# Patient Record
Sex: FEMALE | Race: WHITE
[De-identification: names, ages, dates, MRNs, and addresses within clinical notes are randomized per-mention and may not be internally consistent; named-entity substitution may affect disease eponyms.]

---

## 2019-07-17 ENCOUNTER — HOSPITAL ENCOUNTER (EMERGENCY)
Dept: HOSPITAL 97 - ER | Age: 69
Discharge: HOME | End: 2019-07-17
Payer: COMMERCIAL

## 2019-07-17 DIAGNOSIS — Z88.5: ICD-10-CM

## 2019-07-17 DIAGNOSIS — K21.0: Primary | ICD-10-CM

## 2019-07-17 DIAGNOSIS — F17.210: ICD-10-CM

## 2019-07-17 LAB
ALBUMIN SERPL BCP-MCNC: 4 G/DL (ref 3.4–5)
ALP SERPL-CCNC: 77 U/L (ref 45–117)
ALT SERPL W P-5'-P-CCNC: 19 U/L (ref 12–78)
AST SERPL W P-5'-P-CCNC: 15 U/L (ref 15–37)
BUN BLD-MCNC: 13 MG/DL (ref 7–18)
GLUCOSE SERPLBLD-MCNC: 102 MG/DL (ref 74–106)
HCT VFR BLD CALC: 40.8 % (ref 36–45)
INR BLD: 0.97
LYMPHOCYTES # SPEC AUTO: 2.4 K/UL (ref 0.7–4.9)
MAGNESIUM SERPL-MCNC: 2.4 MG/DL (ref 1.8–2.4)
NT-PROBNP SERPL-MCNC: 31 PG/ML (ref ?–125)
PMV BLD: 10.7 FL (ref 7.6–11.3)
POTASSIUM SERPL-SCNC: 3.9 MMOL/L (ref 3.5–5.1)
RBC # BLD: 4.37 M/UL (ref 3.86–4.86)
TROPONIN (EMERG DEPT USE ONLY): < 0.02 NG/ML (ref 0–0.04)

## 2019-07-17 PROCEDURE — 83735 ASSAY OF MAGNESIUM: CPT

## 2019-07-17 PROCEDURE — 71045 X-RAY EXAM CHEST 1 VIEW: CPT

## 2019-07-17 PROCEDURE — 85610 PROTHROMBIN TIME: CPT

## 2019-07-17 PROCEDURE — 83880 ASSAY OF NATRIURETIC PEPTIDE: CPT

## 2019-07-17 PROCEDURE — 85025 COMPLETE CBC W/AUTO DIFF WBC: CPT

## 2019-07-17 PROCEDURE — 80048 BASIC METABOLIC PNL TOTAL CA: CPT

## 2019-07-17 PROCEDURE — 36415 COLL VENOUS BLD VENIPUNCTURE: CPT

## 2019-07-17 PROCEDURE — 80076 HEPATIC FUNCTION PANEL: CPT

## 2019-07-17 PROCEDURE — 93005 ELECTROCARDIOGRAM TRACING: CPT

## 2019-07-17 PROCEDURE — 84484 ASSAY OF TROPONIN QUANT: CPT

## 2019-07-17 NOTE — EDPHYS
Physician Documentation                                                                           

 [facility]                                                                 

Name: Dora Salazar                                                                              

Age: 68 yrs                                                                                       

Sex: Female                                                                                       

: 1950                                                                                   

MRN: [id number]                                                                                   

Arrival Date: 2019                                                                          

Time: 12:56                                                                                       

Account#: [id number]                                                                            

Bed 27                                                                                            

Private MD: Jareth Bal S                                                                    

ED Physician Wilian Carrasco                                                                         

HPI:                                                                                              

                                                                                             

13:52 This 68 yrs old  Female presents to ER via Ambulatory with complaints of Chest jr8 

      Pain, Chest Pressure.                                                                       

13:52 The patient or guardian reports chest pain that is located primarily in the substernal  jr8 

      area. Onset: The symptoms/episode began/occurred acutely, today. The pain does not          

      radiate. Associated signs and symptoms: Pertinent positives: None. The chest pain is        

      described as burning. Duration: The patient or guardian reports a single episode, that      

      is still ongoing, that lasted 3 hour(s). Modifying factors: The symptoms are alleviated     

      by nothing. the symptoms are aggravated by nothing. Severity of pain: At its worst the      

      pain was moderate in the emergency department the pain is unchanged. It is unknown          

      whether or not the patient has had similar symptoms in the past. The patient has not        

      recently seen a physician. Patient stated that she has history of Reflux and normally       

      takes two Zantac a day with relief. At 11 am this morning started to have bad               

      heartburn. Took her Zantac but still without relief. Stated that she started to get         

      dizzy as well .                                                                             

                                                                                                  

Historical:                                                                                       

- Allergies:                                                                                      

13:02 Codeine;                                                                                hj  

- PMHx:                                                                                           

13:02 None;                                                                                   hj  

- PSHx:                                                                                           

13:02 Hysterectomy;                                                                           hj  

                                                                                                  

- Immunization history:: Adult Immunizations.                                                     

- Social history:: Smoking status: Patient uses tobacco products, smokes one-half pack            

  cigarettes per day.                                                                             

- Ebola Screening: : No symptoms or risks identified at this time.                                

                                                                                                  

                                                                                                  

ROS:                                                                                              

13:52 Eyes: Negative for injury, pain, redness, and discharge, ENT: Negative for injury,      jr8 

      pain, and discharge, Neck: Negative for injury, pain, and swelling, Respiratory:            

      Negative for shortness of breath, cough, wheezing, and pleuritic chest pain,                

      Abdomen/GI: Negative for abdominal pain, nausea, vomiting, diarrhea, and constipation,      

      Back: Negative for injury and pain, MS/Extremity: Negative for injury and deformity,        

      Skin: Negative for injury, rash, and discoloration, Neuro: Negative for headache,           

      weakness, numbness, tingling, and seizure.                                                  

13:52 Cardiovascular: Positive for chest pain, Negative for edema, orthopnea, palpitations,       

      paroxysmal nocturnal dyspnea.                                                               

                                                                                                  

Exam:                                                                                             

13:52 Eyes:  Pupils equal round and reactive to light, extra-ocular motions intact.  Lids and jr8 

      lashes normal.  Conjunctiva and sclera are non-icteric and not injected.  Cornea within     

      normal limits.  Periorbital areas with no swelling, redness, or edema. ENT:  Nares          

      patent. No nasal discharge, no septal abnormalities noted.  Tympanic membranes are          

      normal and external auditory canals are clear.  Oropharynx with no redness, swelling,       

      or masses, exudates, or evidence of obstruction, uvula midline.  Mucous membranes           

      moist. Neck:  Trachea midline, no thyromegaly or masses palpated, and no cervical           

      lymphadenopathy.  Supple, full range of motion without nuchal rigidity, or vertebral        

      point tenderness.  No Meningismus. Chest/axilla:  Normal chest wall appearance and          

      motion.  Nontender with no deformity.  No lesions are appreciated. Cardiovascular:          

      Regular rate and rhythm with a normal S1 and S2.  No gallops, murmurs, or rubs.  Normal     

      PMI, no JVD.  No pulse deficits. Respiratory:  Lungs have equal breath sounds               

      bilaterally, clear to auscultation and percussion.  No rales, rhonchi or wheezes noted.     

       No increased work of breathing, no retractions or nasal flaring. Abdomen/GI:  Soft,        

      non-tender, with normal bowel sounds.  No distension or tympany.  No guarding or            

      rebound.  No evidence of tenderness throughout. Back:  No spinal tenderness.  No            

      costovertebral tenderness.  Full range of motion. Skin:  Warm, dry with normal turgor.      

      Normal color with no rashes, no lesions, and no evidence of cellulitis. MS/ Extremity:      

      Pulses equal, no cyanosis.  Neurovascular intact.  Full, normal range of motion. Neuro:     

       Awake and alert, GCS 15, oriented to person, place, time, and situation.  Cranial          

      nerves II-XII grossly intact.  Motor strength 5/5 in all extremities.  Sensory grossly      

      intact.  Cerebellar exam normal.  Normal gait.                                              

                                                                                                  

Vital Signs:                                                                                      

13:02  / 78; Pulse 71; Resp 18; Temp 98.6(O); Pulse Ox 100% on R/A; Weight 72.57 kg;    hj  

      Height 5 ft. 2 in. (157.48 cm); Pain 8/10;                                                  

14:35  / 70; Pulse 72; Resp 18 S; Pulse Ox 97% on R/A;                                  aa5 

15:26  / 76; Pulse 65; Resp 14 S; Pulse Ox 98% on R/A;                                  ca1 

16:27  / 70; Pulse 68; Resp 14 S; Temp 98.4(O); Pulse Ox 96% on R/A;                    ca1 

17:17  / 62; Pulse 72; Resp 19 S; Temp 98.2(TE); Pulse Ox 98% on R/A;                   ca1 

13:02 Body Mass Index 29.26 (72.57 kg, 157.48 cm)                                             hj  

                                                                                                  

MDM:                                                                                              

13:05 Patient medically screened.                                                             jr8 

15:39 Data reviewed: vital signs, nurses notes, lab test result(s), EKG, radiologic studies,  jr8 

      plain films. Data interpreted: Pulse oximetry: on room air is 97 %. Interpretation:         

      normal. Counseling: I had a detailed discussion with the patient and/or guardian            

      regarding: the historical points, exam findings, and any diagnostic results supporting      

      the discharge/admit diagnosis, lab results, radiology results. Response to treatment:       

      the patient's symptoms have markedly improved after treatment.                              

                                                                                                  

                                                                                             

13:31 Order name: Basic Metabolic Panel                                                       [facility] 

                                                                                             

13:31 Order name: CBC with Diff                                                               [facility] 

                                                                                             

13:31 Order name: LFT's; Complete Time: 14:42                                                 [facility] 

                                                                                             

13:31 Order name: Magnesium; Complete Time: 14:42                                             [facility] 

                                                                                             

13:31 Order name: NT PRO-BNP; Complete Time: 14:42                                            [facility] 

                                                                                             

13:31 Order name: PT-INR; Complete Time: 14:07                                                [facility] 

                                                                                             

13:31 Order name: Troponin (emerg Dept Use Only); Complete Time: 14:42                        [facility] 

                                                                                             

13:31 Order name: XRAY Chest (1 view); Complete Time: 13:51                                   [facility] 

                                                                                             

13:32 Order name: Basic Metabolic Panel; Complete Time: 14:42                                 [facility]

                                                                                             

13:32 Order name: CBC with Automated Diff; Complete Time: 14:07                               [facility]

                                                                                             

15:40 Order name: Troponin (emerg Dept Use Only); Complete Time: 17:01                                                                                                                     

13:31 Order name: EKG; Complete Time: 13:32                                                                                                                                                

13:31 Order name: Cardiac monitoring; Complete Time: :                                                                                             

13:31 Order name: EKG - Nurse/Tech; Complete Time: :33                                                                                                                                   

13:31 Order name: IV Saline Lock; Complete Time: 13:                                                                                             

13:31 Order name: Labs collected and sent; Complete Time: :                                                                                             

13:31 Order name: O2 Per Protocol; Complete Time: :                                                                                             

13:31 Order name: O2 Sat Monitoring; Complete Time: : 

                                                                                                  

Administered Medications:                                                                         

14:35 Drug: GI Cocktail without Donnatal - (Maalox Suspension 30 ml, Lidocaine Liquid 2 % 15  aa5 

      ml) Route: PO;                                                                              

15:30 Follow up: Response: No adverse reaction; Pain is decreased                             ca1 

14:36 Drug: Aspirin Chewable Tablet 324 mg Route: PO;                                         aa5 

15:30 Follow up: Response: No adverse reaction; Pain is decreased                             ca1 

16:17 Drug: ProTONIX 40 mg Route: IVP; Site: right antecubital;                               ca1 

17:17 Follow up: Response: No adverse reaction; Pain is decreased                             ca1 

                                                                                                  

                                                                                                  

Disposition:                                                                                      

18:02 Co-signature as Attending Physician, Wilian Carrasco MD.                                    rn  

                                                                                                  

Disposition:                                                                                      

19 17:02 Discharged to Home. Impression: Gastro-esophageal reflux disease with              

  esophagitis, Other chest pain.                                                                  

- Condition is Stable.                                                                            

- Discharge Instructions: Esophagitis.                                                            

- Prescriptions for Protonix 40 mg Oral Tablet - take 1 tablet by ORAL route once                 

  daily; 30 tablet.                                                                               

- Medication Reconciliation Form, Thank You Letter, Antibiotic Education, Prescription            

  Opioid Use form.                                                                                

- Follow up: Eulogio Kellogg MD; When: 5 - 6 days; Reason: Recheck today's complaints,           

  Continuance of care, Re-evaluation by your physician.                                           

- Problem is new.                                                                                 

- Symptoms have improved.                                                                         

                                                                                                  

                                                                                                  

                                                                                                  

Signatures:                                                                                       

Dispatcher [employer]                           EDWilian Mccoy MD MD rn Calderon, Audri, RN                     RN   aa5                                                  

Heri Lynch PA                        PA   jr8                                                  

Bang García RN RN   hj                                                   

AcobBridgette RN                        RN   ca1                                                  

                                                                                                  

Corrections: (The following items were deleted from the chart)                                    

17:02 17:02 2019 17:02 Discharged to Home. Impression: Gastro-esophageal reflux disease jr8 

      with esophagitis. Condition is Stable. Forms are Medication Reconciliation Form, Thank      

      You Letter, Antibiotic Education, Prescription Opioid Use. Follow up: Eulogio Kellogg;       

      When: 5 - 6 days; Reason: Recheck today's complaints, Continuance of care,                  

      Re-evaluation by your physician. Problem is new. Symptoms have improved. jr8                

17:19 17:02 2019 17:02 Discharged to Home. Impression: Gastro-esophageal reflux disease ca1 

      with esophagitis; Other chest pain. Condition is Stable. Forms are Medication               

      Reconciliation Form, Thank You Letter, Antibiotic Education, Prescription Opioid Use.       

      Follow up: Eulogio Kellogg; When: 5 - 6 days; Reason: Recheck today's complaints,            

      Continuance of care, Re-evaluation by your physician. Problem is new. Symptoms have         

      improved. jr8                                                                               

                                                                                                  

**************************************************************************************************

## 2019-07-17 NOTE — EKG
Test Date:    2019-07-17               Test Time:    13:03:13

Technician:   RAFFAELE                                     

                                                     

MEASUREMENT RESULTS:                                       

Intervals:                                           

Rate:         71                                     

ID:           [id number]                                    

QRSD:         64                                     

QT:           386                                    

QTc:          419                                    

Axis:                                                

P:            61                                     

ID:           140                                    

QRS:          63                                     

T:            65                                     

                                                     

INTERPRETIVE STATEMENTS:                                       

                                                     

Normal sinus rhythm

Low voltage QRS

Borderline ECG

Compared to ECG 05/13/2005 05:50:00

Low QRS voltage now present



Electronically Signed On 07-17-19 15:04:12 CDT by Guzman Miller

## 2019-07-17 NOTE — ER
Nurse's Notes                                                                                     

 [facility]                                                                 

Name: Dora Salazar                                                                              

Age: 68 yrs                                                                                       

Sex: Female                                                                                       

: 1950                                                                                   

MRN: [id number]                                                                                   

Arrival Date: 2019                                                                          

Time: 12:56                                                                                       

Account#: [id number]                                                                            

Bed 27                                                                                            

Private MD: Jareth Bal S                                                                    

Diagnosis: Gastro-esophageal reflux disease with esophagitis;Other chest pain                     

                                                                                                  

Presentation:                                                                                     

                                                                                             

12:59 Presenting complaint: Patient states: i have this chest pain, like a dull burning pain  hj  

      since 9:30 am; i work last night 12 hour shift, i have a bad heart burn; pain is 7/10;      

      reports N/V; reports SOB;. Transition of care: patient was not received from another        

      setting of care. Onset of symptoms was 2019. Risk Assessment: Do you want to       

      hurt yourself or someone else? Patient reports no desire to harm self or others.            

      Initial Sepsis Screen: Does the patient meet any 2 criteria? No. Patient's initial          

      sepsis screen is negative. Does the patient have a suspected source of infection? No.       

      Patient's initial sepsis screen is negative. Care prior to arrival: None.                   

12:59 Method Of Arrival: Ambulatory                                                             

12:59 Acuity: JAMISON 3                                                                           hj  

                                                                                                  

Historical:                                                                                       

- Allergies:                                                                                      

13:02 Codeine;                                                                                hj  

- PMHx:                                                                                           

13:02 None;                                                                                   hj  

- PSHx:                                                                                           

13:02 Hysterectomy;                                                                           hj  

                                                                                                  

- Immunization history:: Adult Immunizations.                                                     

- Social history:: Smoking status: Patient uses tobacco products, smokes one-half pack            

  cigarettes per day.                                                                             

- Ebola Screening: : No symptoms or risks identified at this time.                                

                                                                                                  

                                                                                                  

Screenin:10 Abuse screen: Denies threats or abuse. Denies injuries from another. Nutritional        ca1 

      screening: No deficits noted. Tuberculosis screening: No symptoms or risk factors           

      identified. Fall Risk IV access (20 points).                                                

                                                                                                  

Assessment:                                                                                       

13:10 General: Appears in no apparent distress. uncomfortable, Behavior is calm, cooperative, ca1 

      appropriate for age, Reports unable to lie down. C/O SOB and heartburn when laying          

      down. Pain: Complains of pain in mid-sternal area Pain does not radiate. Pain currently     

      is 8 out of 10 on a pain scale. Quality of pain is described as pressure, Pain began 2      

      hours ago. Is intermittent, Aggravated by repositioning. Neuro: Level of Consciousness      

      is awake, alert, obeys commands, Oriented to person, place, time, situation.                

      Cardiovascular: Heart tones S1 S2 present Capillary refill < 3 seconds Patient's skin       

      is warm and dry. Pulses are all present. Rhythm is sinus rhythm. Respiratory: Airway is     

      patent Respiratory effort is even, unlabored, Respiratory pattern is regular,               

      symmetrical, Breath sounds are clear bilaterally. Respiratory: Reports shortness of         

      breath. GI: Abdomen is flat, non-distended, Bowel sounds present X 4 quads. Abd is soft     

      and non tender X 4 quads. Reports nausea, vomiting. : No deficits noted. No signs         

      and/or symptoms were reported regarding the genitourinary system. EENT: No deficits         

      noted. No signs and/or symptoms were reported regarding the EENT system. Derm: Skin is      

      intact, is healthy with good turgor, Skin is pink, warm \T\ dry. Musculoskeletal:           

      Circulation, motion, and sensation intact. Capillary refill < 3 seconds, Range of           

      motion: intact in all extremities.                                                          

14:35 Reassessment: Patient is alert, oriented x 3, equal unlabored respirations, skin        aa5 

      warm/dry/pink.                                                                              

15:26 Reassessment: Patient appears in no apparent distress at this time. Patient and/or      ca1 

      family updated on plan of care and expected duration. Pain level reassessed. Patient is     

      alert, oriented x 3, equal unlabored respirations, skin warm/dry/pink.                      

16:27 Reassessment: Patient appears in no apparent distress at this time. Patient and/or      ca1 

      family updated on plan of care and expected duration. Pain level reassessed. Patient is     

      alert, oriented x 3, equal unlabored respirations, skin warm/dry/pink.                      

17:17 Reassessment: Patient appears in no apparent distress at this time. Patient is alert,   ca1 

      oriented x 3, equal unlabored respirations, skin warm/dry/pink. Patient states feeling      

      better.                                                                                     

                                                                                                  

Vital Signs:                                                                                      

13:02  / 78; Pulse 71; Resp 18; Temp 98.6(O); Pulse Ox 100% on R/A; Weight 72.57 kg;    hj  

      Height 5 ft. 2 in. (157.48 cm); Pain 8/10;                                                  

14:35  / 70; Pulse 72; Resp 18 S; Pulse Ox 97% on R/A;                                  aa5 

15:26  / 76; Pulse 65; Resp 14 S; Pulse Ox 98% on R/A;                                  ca1 

16:27  / 70; Pulse 68; Resp 14 S; Temp 98.4(O); Pulse Ox 96% on R/A;                    ca1 

17:17  / 62; Pulse 72; Resp 19 S; Temp 98.2(TE); Pulse Ox 98% on R/A;                   ca1 

13:02 Body Mass Index 29.26 (72.57 kg, 157.48 cm)                                               

                                                                                                  

ED Course:                                                                                        

12:56 Patient arrived in ED.                                                                  as  

12:57 Jareth aBl MD is Private Physician.                                               as  

13:01 Triage completed.                                                                       hj  

13:02 Arm band placed on left wrist.                                                          hj  

13:04 Heri Lynch PA is PHCP.                                                               [id number] 

13:04 Wilian Carrasco MD is Attending Physician.                                                [email or web address] 

13:10 Patient has correct armband on for positive identification. Placed in gown. Bed in low  ca1 

      position. Call light in reach. Side rails up X 1. Cardiac monitor on. Pulse ox on. NIBP     

      on. Warm blanket given.                                                                     

13:10 No provider procedures requiring assistance completed.                                  ca1 

13:21 Bridgette Morgan, RN is Primary Nurse.                                                      ca1 

13:35 Inserted saline lock: 22 gauge in right antecubital area, using aseptic technique.      ca1 

      Blood collected. Patient maintains SpO2 saturation greater than 95% on room air.            

13:45 XRAY Chest (1 view) In Process Unspecified.                                             EDMS

17:01 Eulogio Kellogg MD is Referral Physician.                                              [email or web address] 

17:18 IV discontinued, intact, bleeding controlled, No redness/swelling at site. Pressure     ca1 

      dressing applied.                                                                           

                                                                                                  

Administered Medications:                                                                         

14:35 Drug: GI Cocktail without Donnatal - (Maalox Suspension 30 ml, Lidocaine Liquid 2 % 15  aa5 

      ml) Route: PO;                                                                              

15:30 Follow up: Response: No adverse reaction; Pain is decreased                             ca1 

14:36 Drug: Aspirin Chewable Tablet 324 mg Route: PO;                                         aa5 

15:30 Follow up: Response: No adverse reaction; Pain is decreased                             ca1 

16:17 Drug: ProTONIX 40 mg Route: IVP; Site: right antecubital;                               ca1 

17:17 Follow up: Response: No adverse reaction; Pain is decreased                             ca1 

                                                                                                  

                                                                                                  

Outcome:                                                                                          

17:02 Discharge ordered by MD.                                                                [id number] 

17:18 Discharged to home ambulatory, with family.                                             ca1 

17:18 Condition: stable                                                                           

17:18 Discharge instructions given to patient, Instructed on discharge instructions, follow       

      up and referral plans. medication usage, Demonstrated understanding of instructions,        

      follow-up care, medications, Prescriptions given X 1.                                       

17:19 Patient left the ED.                                                                    ca1 

                                                                                                  

Signatures:                                                                                       

Dispatcher [employer]                           Danisha Daly Audri, RN                     RN   aa5                                                  

Heri Lynch PA PA [email or web address]                                                  

Bang García RN RN                                                      

Bridgette Morgan RN                        RN   ca1                                                  

                                                                                                  

Corrections: (The following items were deleted from the chart)                                    

13:04 13:02 Pulse 71bpm; Resp 18bpm; Pulse Ox 100% RA; Temp 98.6F Oral; 72.57 kg; Height 5    hj  

      ft. 2 in.; BMI: 29.2; Pain 8/10; hj                                                         

                                                                                                  

**************************************************************************************************

## 2019-07-17 NOTE — RAD REPORT
EXAM DESCRIPTION:  Raissa Single View7/17/2019 1:43 pm

 

CLINICAL HISTORY:  Chest pain

 

COMPARISON:  none

 

FINDINGS:   The lungs appear clear of acute infiltrate. The heart is normal size

 

IMPRESSION:   No acute abnormalities displayed

## 2019-09-30 ENCOUNTER — HOSPITAL ENCOUNTER (EMERGENCY)
Dept: HOSPITAL 97 - ER | Age: 69
Discharge: HOME | End: 2019-09-30
Payer: COMMERCIAL

## 2019-09-30 VITALS — DIASTOLIC BLOOD PRESSURE: 70 MMHG | SYSTOLIC BLOOD PRESSURE: 144 MMHG | OXYGEN SATURATION: 97 %

## 2019-09-30 VITALS — TEMPERATURE: 97.3 F

## 2019-09-30 DIAGNOSIS — R07.9: Primary | ICD-10-CM

## 2019-09-30 DIAGNOSIS — K21.9: ICD-10-CM

## 2019-09-30 DIAGNOSIS — Z88.6: ICD-10-CM

## 2019-09-30 LAB
ALBUMIN SERPL BCP-MCNC: 4.2 G/DL (ref 3.4–5)
ALP SERPL-CCNC: 68 U/L (ref 45–117)
ALT SERPL W P-5'-P-CCNC: 18 U/L (ref 12–78)
AST SERPL W P-5'-P-CCNC: 16 U/L (ref 15–37)
BUN BLD-MCNC: 13 MG/DL (ref 7–18)
GLUCOSE SERPLBLD-MCNC: 102 MG/DL (ref 74–106)
HCT VFR BLD CALC: 39.6 % (ref 36–45)
LIPASE SERPL-CCNC: 82 U/L (ref 73–393)
LYMPHOCYTES # SPEC AUTO: 1.6 K/UL (ref 0.7–4.9)
NT-PROBNP SERPL-MCNC: 59 PG/ML (ref ?–125)
PMV BLD: 10.5 FL (ref 7.6–11.3)
POTASSIUM SERPL-SCNC: 3.9 MMOL/L (ref 3.5–5.1)
RBC # BLD: 4.26 M/UL (ref 3.86–4.86)
TROPONIN (EMERG DEPT USE ONLY): < 0.02 NG/ML (ref 0–0.04)

## 2019-09-30 PROCEDURE — 36415 COLL VENOUS BLD VENIPUNCTURE: CPT

## 2019-09-30 PROCEDURE — 71045 X-RAY EXAM CHEST 1 VIEW: CPT

## 2019-09-30 PROCEDURE — 80076 HEPATIC FUNCTION PANEL: CPT

## 2019-09-30 PROCEDURE — 85025 COMPLETE CBC W/AUTO DIFF WBC: CPT

## 2019-09-30 PROCEDURE — 80048 BASIC METABOLIC PNL TOTAL CA: CPT

## 2019-09-30 PROCEDURE — 93005 ELECTROCARDIOGRAM TRACING: CPT

## 2019-09-30 PROCEDURE — 84484 ASSAY OF TROPONIN QUANT: CPT

## 2019-09-30 PROCEDURE — 83690 ASSAY OF LIPASE: CPT

## 2019-09-30 PROCEDURE — 83880 ASSAY OF NATRIURETIC PEPTIDE: CPT

## 2019-09-30 PROCEDURE — 99284 EMERGENCY DEPT VISIT MOD MDM: CPT

## 2019-09-30 PROCEDURE — 96374 THER/PROPH/DIAG INJ IV PUSH: CPT

## 2019-09-30 NOTE — ER
Nurse's Notes                                                                                     

 [facility]                                                                 

Name: Dora Salazar                                                                              

Age: 69 yrs                                                                                       

Sex: Female                                                                                       

: 1950                                                                                   

MRN: [id number]                                                                                   

Arrival Date: 2019                                                                          

Time: 12:28                                                                                       

Account#: [id number]                                                                            

Bed 4                                                                                             

Private MD: Jareth Bal S                                                                    

Diagnosis: Chest pain, unspecified;Gastro-esophageal reflux disease with esophagitis              

                                                                                                  

Presentation:                                                                                     

                                                                                             

12:36 Presenting complaint: Patient states: Since 1000 this morning I have been having a      la1 

      burning pain in my chest from my throat down but also feeling kind of lightheaded. I am     

      on a daily PPI from my GI but it isnt helping. Transition of care: patient was not          

      received from another setting of care. Onset of symptoms was 2019. Risk       

      Assessment: Do you want to hurt yourself or someone else? Patient reports no desire to      

      harm self or others. Initial Sepsis Screen: Does the patient meet any 2 criteria? No.       

      Patient's initial sepsis screen is negative. Does the patient have a suspected source       

      of infection? No. Patient's initial sepsis screen is negative. Care prior to arrival:       

      None.                                                                                       

12:36 Method Of Arrival: Ambulatory                                                           la1 

12:36 Acuity: JAMISON 3                                                                           la1 

                                                                                                  

Historical:                                                                                       

- Allergies:                                                                                      

14:53 Codeine;                                                                                tw2 

- PSHx:                                                                                           

14:53 Hysterectomy;                                                                           tw2 

                                                                                                  

- Immunization history:: Adult Immunizations up to date.                                          

- Social history:: Smoking status: .                                                              

- Ebola Screening: : Patient denies travel to an Ebola-affected area in the 21 days               

  before illness onset.                                                                           

- Family history:: not pertinent.                                                                 

- Hospitalizations: : No recent hospitalization is reported.                                      

                                                                                                  

                                                                                                  

Screenin:15 Abuse screen: Denies threats or abuse. Denies injuries from another. Nutritional        aj1 

      screening: No deficits noted. Tuberculosis screening: No symptoms or risk factors           

      identified. Fall Risk None identified.                                                      

                                                                                                  

Assessment:                                                                                       

14:15 General: Appears in no apparent distress. comfortable, Behavior is calm, cooperative,   aj1 

      appropriate for age. Pain: Complains of pain in mid-sternal area Pain does not radiate.     

      Pain currently is 5 out of 10 on a pain scale. Quality of pain is described as burning,     

      Pain began gradually. Neuro: Level of Consciousness is awake, alert, obeys commands,        

      Oriented to person, place, time, situation. Cardiovascular: Reports chest pain, Denies      

      shortness of breath, Heart tones S1 S2 present Patient's skin is warm and dry. Rhythm       

      is regular. Respiratory: Airway is patent Respiratory effort is even, unlabored,            

      Respiratory pattern is regular, symmetrical, Breath sounds are clear bilaterally. GI:       

      Abdomen is non-distended, Reports that she recently had an endoscopy done by Dr. Kellogg     

      and she was told that she had esophagitis, a peptic ulcer and a hernia. States that she     

      was given a GI cocktail in triage and that help diminish her pain by a great deal. :      

      No signs and/or symptoms were reported regarding the genitourinary system. EENT: No         

      signs and/or symptoms were reported regarding the EENT system. Derm: No signs and/or        

      symptoms reported regarding the dermatologic system. Skin is pink, warm \T\ dry. normal.    

      Musculoskeletal: No signs and/or symptoms reported regarding the musculoskeletal            

      system. Circulation, motion, and sensation intact.                                          

15:48 Reassessment: Patient appears in no apparent distress at this time. No changes from     tw2 

      previously documented assessment. Patient and/or family updated on plan of care and         

      expected duration. Pain level reassessed. Patient is alert, oriented x 3, equal             

      unlabored respirations, skin warm/dry/pink.                                                 

16:02 Reassessment: Patient appears in no apparent distress at this time. No changes from     tw2 

      previously documented assessment. Patient and/or family updated on plan of care and         

      expected duration. Pain level reassessed. Patient is alert, oriented x 3, equal             

      unlabored respirations, skin warm/dry/pink.                                                 

                                                                                                  

Vital Signs:                                                                                      

12:37  / 73; Pulse 74; Resp 16; Temp 97.3; Pulse Ox 100% on R/A;                        la1 

14:51  / 66; Pulse 62; Resp 10; Pulse Ox 99% on R/A;                                    tw2 

15:48  / 70; Pulse 64; Resp 20; Pulse Ox 97% on R/A;                                    tw2 

                                                                                                  

ED Course:                                                                                        

12:28 Patient arrived in ED.                                                                  as  

12:28 Jareth Bal MD is Private Physician.                                               as  

12:37 Triage completed.                                                                       la1 

12:37 Arm band placed on right wrist. EKG completed in triage. Results shown to MD.           la1 

14:00 Amber De León, RN is Primary Nurse.                                                   aj1 

14:01 Wilian Carrasco MD is Attending Physician.                                                rn  

14:15 Patient has correct armband on for positive identification. Cardiac monitor on. Pulse   aj1 

      ox on. NIBP on.                                                                             

14:18 No provider procedures requiring assistance completed. Patient maintains SpO2           aj1 

      saturation greater than 95% on room air.                                                    

16:02 IV discontinued, intact, bleeding controlled, No redness/swelling at site. Pressure     tw2 

      dressing applied.                                                                           

                                                                                                  

Administered Medications:                                                                         

14:22 Drug: Pepcid 20 mg Route: IVP; Site: right antecubital;                                 tw2 

15:54 Follow up: Response: No adverse reaction; Pain is decreased                             tw2 

                                                                                                  

                                                                                                  

Outcome:                                                                                          

15:49 Discharge ordered by MD.                                                                rn  

16:02 Discharged to home ambulatory.                                                          tw2 

16:02 Condition: stable                                                                           

16:02 Discharge instructions given to patient, Instructed on discharge instructions, follow       

      up and referral plans. Demonstrated understanding of instructions, follow-up care.          

16:03 Patient left the ED.                                                                    tw2 

                                                                                                  

Signatures:                                                                                       

Amber De León RN RN   aj1                                                  

Danisha Gandara Roman, MD MD rn Attema, Lee, RN RN   la1                                                  

Danita Mcclendon RN RN   tw2                                                  

                                                                                                  

**************************************************************************************************

## 2019-09-30 NOTE — EDPHYS
Physician Documentation                                                                           

 [facility]                                                                 

Name: Dora Salzaar                                                                              

Age: 69 yrs                                                                                       

Sex: Female                                                                                       

: 1950                                                                                   

MRN: [id number]                                                                                   

Arrival Date: 2019                                                                          

Time: 12:28                                                                                       

Account#: [id number]                                                                            

Bed 4                                                                                             

Private MD: Jareth Bal S                                                                    

ED Physician Wilian Carrasco                                                                         

HPI:                                                                                              

                                                                                             

15:14 This 69 yrs old  Female presents to ER via Ambulatory with complaints of Chest rn  

      Pain, Dizziness.                                                                            

15:14 The patient or guardian reports chest pain that is located primarily in the substernal  rn  

      area. Onset: 2 week(s) ago. The pain radiates to abdomen. Associated signs and              

      symptoms: Pertinent positives: abdominal pain, Pertinent negatives: cough, diaphoresis,     

      dizziness, lightheadedness, near syncope, palpitations, shortness of breath, syncope,       

      vomiting. The chest pain is described as burning. Duration: The patient or guardian         

      reports multiple episodes, that are intermittent. Modifying factors: The symptoms are       

      alleviated by nothing. the symptoms are aggravated by palpation of area, food. Severity     

      of pain: At its worst the pain was mild in the emergency department the pain has            

      improved. The patient has experienced similar episodes in the past. REports had EGD 2       

      weeks ago, told has esophagitis and gastric ulcer, put on antacids, has still been          

      having problems since then, taking meds, reports seen for this before and resolved with     

      GI cocktail. Reports burning pain from stomach to chest. No radiation. .                    

                                                                                                  

Historical:                                                                                       

- Allergies:                                                                                      

14:53 Codeine;                                                                                tw2 

- PSHx:                                                                                           

14:53 Hysterectomy;                                                                           tw2 

                                                                                                  

- Immunization history:: Adult Immunizations up to date.                                          

- Social history:: Smoking status: .                                                              

- Ebola Screening: : Patient denies travel to an Ebola-affected area in the 21 days               

  before illness onset.                                                                           

- Family history:: not pertinent.                                                                 

- Hospitalizations: : No recent hospitalization is reported.                                      

                                                                                                  

                                                                                                  

ROS:                                                                                              

15:14 Constitutional: Negative for fever, chills, and weight loss, Eyes: Negative for injury, rn  

      pain, redness, and discharge, Neck: Negative for injury, pain, and swelling,                

      Cardiovascular: Negative for palpitations, and edema, Respiratory: Negative for             

      shortness of breath, cough, wheezing, and pleuritic chest pain, Abdomen/GI: Negative        

      for vomiting, diarrhea, and constipation, Back: Negative for injury and pain,               

      MS/Extremity: Negative for injury and deformity, Skin: Negative for injury, rash, and       

      discoloration, Neuro: Negative for headache, weakness, numbness, tingling, and seizure.     

                                                                                                  

Exam:                                                                                             

15:14 Constitutional:  This is a well developed, well nourished patient who is awake, alert,  rn  

      and in no acute distress. Head/Face:  Normocephalic, atraumatic. Eyes:  Pupils equal        

      round and reactive to light, extra-ocular motions intact.  Lids and lashes normal.          

      Conjunctiva and sclera are non-icteric and not injected.  Cornea within normal limits.      

      Periorbital areas with no swelling, redness, or edema. Cardiovascular:  Regular rate        

      and rhythm.  No pulse deficits. Respiratory:  Lungs have equal breath sounds                

      bilaterally, clear to auscultation.  No increased work of breathing, no retractions or      

      nasal flaring. Abdomen/GI:  soft, non-tender MS/ Extremity:  Pulses equal, no cyanosis.     

       Neurovascular intact.  Full, normal range of motion.  Equal circumference. Neuro:          

      Awake and alert, GCS 15, oriented to person, place, time, and situation.  Cranial           

      nerves II-XII grossly intact.  Motor strength 5/5 in all extremities.  Sensory grossly      

      intact.  Cerebellar exam normal.  Normal gait.                                              

                                                                                                  

Vital Signs:                                                                                      

12:37  / 73; Pulse 74; Resp 16; Temp 97.3; Pulse Ox 100% on R/A;                        la1 

14:51  / 66; Pulse 62; Resp 10; Pulse Ox 99% on R/A;                                    tw2 

15:48  / 70; Pulse 64; Resp 20; Pulse Ox 97% on R/A;                                    tw2 

                                                                                                  

MDM:                                                                                              

14:01 Patient medically screened.                                                             rn  

15:14 ED course: Patient given GI cocktail in lobby, when brought back reports resolved pain, rn  

      feels much better. .                                                                        

15:48 Differential diagnosis: acute pericarditis, coronary artery disease costochondritis,    rn  

      esophagitis, gastritis, gastroesophageal reflux disease (GERD), pancreatitis, peptic        

      ulcer disease, pericarditis, pleurisy. Data reviewed: vital signs, nurses notes, lab        

      test result(s), EKG, radiologic studies, plain films, and as a result, I will discharge     

      patient. Counseling: I had a detailed discussion with the patient and/or guardian           

      regarding: the historical points, exam findings, and any diagnostic results supporting      

      the discharge/admit diagnosis, lab results, radiology results, the need for outpatient      

      follow up, to return to the emergency department if symptoms worsen or persist or if        

      there are any questions or concerns that arise at home. Special discussion: I discussed     

      with the patient/guardian in detail that at this point there is no indication for           

      admission to the hospital. It is understood, however, that if the symptoms persist or       

      worsen the patient needs to return immediately for re-evaluation.                           

                                                                                                  

                                                                                             

14:06 Order name: Basic Metabolic Panel                                                       rn  

                                                                                             

14:06 Order name: CBC with Diff                                                               rn  

                                                                                             

14:06 Order name: LFT's                                                                       rn  

                                                                                             

14:06 Order name: NT PRO-BNP                                                                  rn  

                                                                                             

14:06 Order name: Troponin (emerg Dept Use Only)                                              rn  

                                                                                             

14:06 Order name: Lipase                                                                      rn  

                                                                                             

14:06 Order name: XRAY Chest (1 view)                                                         rn  

                                                                                             

15:30 Order name: CBC with Automated Diff; Complete Time: 15:47                               EDMS

                                                                                             

15:30 Order name: Basic Metabolic Panel; Complete Time: 15:47                                 EDMS

                                                                                             

15:30 Order name: Liver (Hepatic) Function; Complete Time: 15:47                              EDMS

                                                                                             

15:30 Order name: Troponin (Emerg Dept Use Only); Complete Time: 15:47                        EDMS

                                                                                             

15:30 Order name: NT PRO-BNP; Complete Time: 15:47                                            EDMS

                                                                                             

15:30 Order name: Lipase; Complete Time: 15:47                                                EDMS

                                                                                             

15:35 Order name: RAD; Complete Time: 15:47                                                   EDMS

                                                                                             

14:06 Order name: EKG; Complete Time: 15:32                                                   rn  

                                                                                             

14:06 Order name: Cardiac monitoring; Complete Time: 14:13                                    rn  

                                                                                             

14:06 Order name: EKG - Nurse/Tech; Complete Time: 14:13                                      rn  

                                                                                             

14:06 Order name: IV Saline Lock; Complete Time: 14:25                                        rn  

                                                                                             

14:06 Order name: Labs collected and sent; Complete Time: 14:25                               rn  

                                                                                             

14:06 Order name: O2 Per Protocol; Complete Time: 14:13                                       rn  

                                                                                             

14:06 Order name: O2 Sat Monitoring; Complete Time: 14:13                                     rn  

                                                                                                  

Administered Medications:                                                                         

14:22 Drug: Pepcid 20 mg Route: IVP; Site: right antecubital;                                 tw2 

15:54 Follow up: Response: No adverse reaction; Pain is decreased                             tw2 

                                                                                                  

                                                                                                  

Disposition:                                                                                      

19 15:49 Discharged to Home. Impression: Chest pain, unspecified, Gastro-esophageal         

  reflux disease with esophagitis.                                                                

- Condition is Stable.                                                                            

- Discharge Instructions: Food Choices for Gastroesophageal Reflux Disease, Adult,                

  Esophagitis, Gastroesophageal Reflux Disease, Adult.                                            

                                                                                                  

- Medication Reconciliation Form, Thank You Letter, Antibiotic Education, Prescription            

  Opioid Use form.                                                                                

- Follow up: Private Physician; When: As needed; Reason: Recheck today's complaints,              

  Re-evaluation by your physician.                                                                

- Problem is new.                                                                                 

- Symptoms have improved.                                                                         

                                                                                                  

                                                                                                  

                                                                                                  

Signatures:                                                                                       

Dispatcher [employer]                           EDAmber Bonds RN                     RN   [id number]                                                  

Wilian Carrasco MD MD rn Wise, Tara, RN                          RN   tw2                                                  

                                                                                                  

Corrections: (The following items were deleted from the chart)                                    

16:03 15:49 2019 15:49 Discharged to Home. Impression: Chest pain, unspecified;         tw2 

      Gastro-esophageal reflux disease with esophagitis. Condition is Stable. Forms are           

      Medication Reconciliation Form, Thank You Letter, Antibiotic Education, Prescription        

      Opioid Use. Follow up: Private Physician; When: As needed; Reason: Recheck today's          

      complaints, Re-evaluation by your physician. Problem is new. Symptoms have improved. rn     

                                                                                                  

**************************************************************************************************

## 2019-09-30 NOTE — RAD REPORT
EXAM DESCRIPTION:  RAD - Chest Single View - 9/30/2019 2:50 pm

 

CLINICAL HISTORY:  Chest pain

 

COMPARISON:  July 17

 

TECHNIQUE:  AP portable chest image was obtained 1445 hours .

 

FINDINGS:  Lungs are clear. Heart and vasculature are normal. No measurable pleural effusion and no p
neumothorax. No acute bony abnormality seen. No acute aortic findings suspected.

 

IMPRESSION:  No acute cardiopulmonary process.

 

 No significant change from comparison.

## 2019-10-01 NOTE — EKG
Test Date:    2019-09-30               Test Time:    12:40:08

Technician:   RAFFAELE                                     

                                                     

MEASUREMENT RESULTS:                                       

Intervals:                                           

Rate:         74                                     

OR:           134                                    

QRSD:         64                                     

QT:           388                                    

QTc:          430                                    

Axis:                                                

P:            45                                     

OR:           134                                    

QRS:          67                                     

T:            70                                     

                                                     

INTERPRETIVE STATEMENTS:                                       

                                                     

Normal sinus rhythm

Septal infarct, age undetermined

Abnormal ECG

Compared to ECG 07/17/2019 13:03:13

Myocardial infarct finding now present



Electronically Signed On 10-01-19 07:27:43 CDT by Guzman Miller

## 2022-09-28 ENCOUNTER — HOSPITAL ENCOUNTER (EMERGENCY)
Dept: HOSPITAL 97 - ER | Age: 72
Discharge: HOME | End: 2022-09-28
Payer: COMMERCIAL

## 2022-09-28 DIAGNOSIS — Z72.0: ICD-10-CM

## 2022-09-28 DIAGNOSIS — R06.00: Primary | ICD-10-CM

## 2022-09-28 DIAGNOSIS — R05.9: ICD-10-CM

## 2022-09-28 DIAGNOSIS — Z20.822: ICD-10-CM

## 2022-09-28 DIAGNOSIS — Z88.5: ICD-10-CM

## 2022-09-28 DIAGNOSIS — F32.A: ICD-10-CM

## 2022-09-28 LAB
BUN BLD-MCNC: 8 MG/DL (ref 7–18)
GLUCOSE SERPLBLD-MCNC: 99 MG/DL (ref 74–106)
HCT VFR BLD CALC: 39.3 % (ref 36–45)
LYMPHOCYTES # SPEC AUTO: 1.2 K/UL (ref 0.7–4.9)
MCV RBC: 91 FL (ref 80–100)
NT-PROBNP SERPL-MCNC: 217 PG/ML (ref ?–125)
PMV BLD: 9.5 FL (ref 7.6–11.3)
POTASSIUM SERPL-SCNC: 4.2 MMOL/L (ref 3.5–5.1)
RBC # BLD: 4.32 M/UL (ref 3.86–4.86)
TROPONIN I SERPL HS-MCNC: 4.4 PG/ML (ref ?–58.9)

## 2022-09-28 PROCEDURE — 85379 FIBRIN DEGRADATION QUANT: CPT

## 2022-09-28 PROCEDURE — 85025 COMPLETE CBC W/AUTO DIFF WBC: CPT

## 2022-09-28 PROCEDURE — 80048 BASIC METABOLIC PNL TOTAL CA: CPT

## 2022-09-28 PROCEDURE — 36415 COLL VENOUS BLD VENIPUNCTURE: CPT

## 2022-09-28 PROCEDURE — 83880 ASSAY OF NATRIURETIC PEPTIDE: CPT

## 2022-09-28 PROCEDURE — 84484 ASSAY OF TROPONIN QUANT: CPT

## 2022-09-28 PROCEDURE — 93005 ELECTROCARDIOGRAM TRACING: CPT

## 2022-09-28 PROCEDURE — 99284 EMERGENCY DEPT VISIT MOD MDM: CPT

## 2022-09-28 PROCEDURE — 71045 X-RAY EXAM CHEST 1 VIEW: CPT

## 2022-09-28 NOTE — RAD REPORT
EXAM DESCRIPTION:  [place] Single View9/28/2022 12:37 pm

 

CLINICAL HISTORY:  Shortness of breath

 

COMPARISON:  2019

 

FINDINGS:   The lungs appear clear of acute infiltrate. The heart is normal size

 

IMPRESSION:   No acute abnormalities displayed

## 2022-09-28 NOTE — ER
Nurse's Notes                                                                                     

 [facility]                                                                 

Name: Dora Salazar                                                                              

Age: 72 yrs                                                                                       

Sex: Female                                                                                       

: 1950                                                                                   

MRN: [id number]                                                                                   

Arrival Date: 2022                                                                          

Time: 12:07                                                                                       

Account#: [id number]                                                                            

Bed 17                                                                                            

Private MD:                                                                                       

Diagnosis: Dyspnea, unspecified                                                                   

                                                                                                  

Presentation:                                                                                     

                                                                                             

12:10 Chief complaint: Patient states: I started having a hard time breathing on Monday and   iw  

      it's getting worse and I feel like I am getting pneumonia. Coronavirus screen: Client       

      presents with at least one sign or symptom that may indicate coronavirus-19.                

      Standard/surgical mask placed on the client. Ebola Screen: No symptoms or risks             

      identified at this time. Initial Sepsis Screen: Does the patient meet any 2 criteria?       

      No. Patient's initial sepsis screen is negative. Does the patient have a suspected          

      source of infection? No. Patient's initial sepsis screen is negative. Risk Assessment:      

      Do you want to hurt yourself or someone else? Patient reports no desire to harm self or     

      others. Onset of symptoms is unknown.                                                       

12:10 Method Of Arrival: Ambulatory                                                           iw  

12:10 Acuity: JAMISON 2                                                                           iw  

                                                                                                  

Triage Assessment:                                                                                

12:11 General: Appears in no apparent distress. comfortable, Behavior is calm, cooperative,   iw  

      appropriate for age. Pain: Denies pain. EENT: No deficits noted. No signs and/or            

      symptoms were reported regarding the EENT system. Neuro: No deficits noted.                 

      Cardiovascular: No deficits noted. Respiratory: Reports shortness of breath at rest on      

      exertion cough that is productive, pain with cough pain with respiration Airway is          

      patent Respiratory effort is even, unlabored, Respiratory pattern is regular,               

      symmetrical, Breath sounds with wheezes bilaterally. Onset: The symptoms/episode            

      began/occurred gradually, the patient has moderate shortness of breath. GI: No deficits     

      noted. No signs and/or symptoms were reported involving the gastrointestinal system.        

      : No deficits noted. No signs and/or symptoms were reported regarding the                 

      genitourinary system. Derm: No deficits noted. No signs and/or symptoms reported            

      regarding the dermatologic system. Musculoskeletal: No deficits noted. No signs and/or      

      symptoms reported regarding the musculoskeletal system.                                     

                                                                                                  

Historical:                                                                                       

- Allergies:                                                                                      

12:11 Codeine;                                                                                iw  

- Home Meds:                                                                                      

12:11 Zoloft 100 mg Oral tab 1 tab once daily [Active];                                       iw  

- PMHx:                                                                                           

12:11 Depressive disorder;                                                                    iw  

- PSHx:                                                                                           

12:11 Total abdominal hysterectomy;                                                           iw  

                                                                                                  

- Immunization history:: Adult Immunizations up to date, Client reports receiving the             

  2nd dose of the Covid vaccine, Client reports receiving the 1st dose of the Covid               

  vaccine.                                                                                        

- Social history:: Smoking status: Patient reports the use of cigarette tobacco                   

  products, unknown amount.                                                                       

                                                                                                  

                                                                                                  

Screenin:15 Abuse screen: Denies threats or abuse. Denies injuries from another. Nutritional        bp  

      screening: No deficits noted. Tuberculosis screening: No symptoms or risk factors           

      identified. Fall Risk None identified.                                                      

                                                                                                  

Assessment:                                                                                       

12:15 General: SEE TRIAGE NOTE.                                                               bp  

14:00 Reassessment: No changes from previously documented assessment. Patient and/or family   bp  

      updated on plan of care and expected duration. Pain level reassessed.                       

                                                                                                  

Vital Signs:                                                                                      

12:10  / 77; Pulse 72; Resp 20; Temp 98.1(O); Pulse Ox 95% on R/A; Weight 72.57 kg (R); iw  

      Height 5 ft. 4 in. (162.56 cm); Pain 0/10;                                                  

14:09  / 99; Pulse 71; Resp 16; Pulse Ox 99% ;                                          bp  

12:10 Body Mass Index 27.46 (72.57 kg, 162.56 cm)                                             iw  

                                                                                                  

ED Course:                                                                                        

12:07 Patient arrived in ED.                                                                  rg4 

12:11 Triage completed.                                                                       iw  

12:11 Arm band placed on left wrist.                                                          iw  

12:15 Sukumar Estrada is PHCP.                                                                  jl9 

12:15 Wilian Carrasco MD is Attending Physician.                                                jl9 

12:15 Patient has correct armband on for positive identification. Bed in low position. Call   bp  

      light in reach. Side rails up X2.                                                           

12:22 Bijan Fountain, RN is Primary Nurse.                                                    bp  

12:39 XRAY Chest (1 view) In Process Unspecified.                                             EDMS

12:43 Initial lab(s) drawn, by me, sent to lab. EKG done, by ED staff, reviewed by Sukumar Estrada. Inserted saline lock: 20 gauge in left antecubital area, using aseptic             

      technique. Blood collected.                                                                 

12:44 D-Dimer Sent.                                                                           jw7 

12:44 BNP Sent.                                                                               jw7 

12:44 D-Dimer Sent.                                                                           jw7 

12:44 Basic Metabolic Panel Sent.                                                             jw7 

12:44 CBC with Diff Sent.                                                                     jw7 

12:44 Troponin HS Sent.                                                                       jw7 

12:51 BNP Sent.                                                                               jw7 

12:51 D-Dimer Sent.                                                                           jw7 

12:51 Basic Metabolic Panel Sent.                                                             jw7 

12:51 CBC with Diff Sent.                                                                     jw7 

12:51 Troponin HS Sent.                                                                       jw7 

                                                                                                  

Administered Medications:                                                                         

No medications were administered                                                                  

                                                                                                  

                                                                                                  

Outcome:                                                                                          

14:40 Discharge ordered by MD. randolph 

14:59 Patient left the ED.                                                                    7 

                                                                                                  

Signatures:                                                                                       

Dispatcher [employer]                           EDMS                                                 

Irma Pond RN RN iw Garcia, Rubi                                 4                                                  

Bijan Fountain RN RN bp McCarthy, Brittany, RN RN bm7 Waits, Jodi                                  jw7                                                  

Sukumar Estrada                                jl9                                                  

                                                                                                  

Corrections: (The following items were deleted from the chart)                                    

12:13 12:11 PSHx: hyst; george vazquez  

                                                                                                  

**************************************************************************************************

## 2022-09-28 NOTE — XMS REPORT
Continuity of Care Document

                          Created on:2022



Patient:JIM BAKER

Sex:Female

:1950

External Reference #:[id number]





Demographics







                          Address                   [address]



                                                    [place] [address]

 

                          Home Phone                [phone]

 

                          Work Phone                [phone]

 

                          Mobile Phone              [phone]

 

                          Email Address             [email or web address]

 

                          Preferred Language        en

 

                          Marital Status            Unknown

 

                          Orthodox Affiliation     Unknown

 

                          Race                      Unknown

 

                          Additional Race(s)        White

 

                          Ethnic Group              Not  or 









Author







                          Organization              [facility]

t

 

                          Address                   [address]



                                                    [place] [address]

 

                          Phone                     [phone]









Support







                Name            Relationship    Address         Phone

 

                1               Hazel Boyce    Sibling         [address]    [phone]



                                                [place] [address] 









Care Team Providers







                    Name                Role                Phone

 

                    Jareth Bal MD  Primary Care Physician [phone]

 

                    Jareth Bal MD  Attending Clinician [phone]









Payers







           Payer Name Policy Type Policy Number Effective Date Expiration Date S

ource







Problems







       Condition Condition Condition Status Onset  Resolution Last   Treating Co

mments 

Source



       Name   Details Category        Date   Date   Treatment Clinician        



                                                 Date                 

 

       Gastroesop Gastroesop Disease Active                              U

nivers



       hageal hageal               8-19                               ity of



       reflux reflux               00:00:                             [place]



       disease disease               00                                 Medical



       without without                                                  Branch



       esophagiti esophagiti                                                  



       s      s                                                       

 

       Left arm Left arm Disease Active 2016                             Unive

rs



       pain   pain                 0-04                               ity of



                                   00:00:                             [place]



                                   00                                 Medical



                                                                      Branch

 

       Anxiety Anxiety Disease Active 2015                             Univers



                                   0-28                               ity of



                                   00:00:                             [place]



                                   00                                 Medical



                                                                      Branch







Allergies, Adverse Reactions, Alerts







       Allergy Allergy Status Severity Reaction(s) Onset  Inactive Treating Comm

ents 

Source



       Name   Type                        Date   Date   Clinician        

 

       Hydrocod Propensi Active        Rash   2016                      Univer

s



       one    ty to                       0-04                        ity of



              adverse                      00:00:                      [place]



              reaction                      00                          Medical



              s                                                       Branch

 

       Codeine Propensi Active        Rash                         Univers



              ty to                       9-25                        ity of



              adverse                      00:00:                      [place]



              reaction                      00                          Medical



              s                                                       Branch

 

       Penicill Propensi Active        Rash                         Univer

s



       ins    ty to                       9-25                        ity of



              adverse                      00:00:                      [place]



              reaction                      00                          Medical



              s                                                       Branch







Social History







           Social Habit Start Date Stop Date  Quantity   Comments   Source

 

           History of tobacco                       Cigarette Smoker            

[facility]



           use                                                    [facility]

 

           History SDOH                                             [facility] o

f



           Alcohol Frequency                                             [facility]

edical



                                                                  Branch

 

           History [facility]                                             University o

f



           Alcohol Std Drinks                                             [facility]

 

           History [facility]                                             University o

f



           Alcohol Binge                                             [place] [facility]

al



                                                                  [place]

 

           Exposure to 2022 Not sure              University 



           SARS-CoV-2 (event) 00:00:00   07:19:00                         [facility]

 

           Cigarettes smoked 2021                       Univers

ity of



           current (pack per 00:00:00   00:00:00                         [facility]



           day) - Reported                                             Branch

 

           Cigarette  2021                       [facility]



           pack-years 00:00:00   00:00:00                         [facility]

 

           Tobacco use and 2021 Never used            Universit

y of



           exposure   00:00:00   00:00:00                         [facility]

 

           Alcohol intake 2021 0 /d                  University

 of



                      00:00:00   00:00:00                         [facility]

 

           Alcohol Comment 2015 ocassional            Universit

y of



                      00:00:00   00:00:00                         [facility]

 

           Sex Assigned At [birth year]                       Universit

y of



           Birth      00:00:00   00:00:00                         [facility]









                Smoking Status  Start Date      Stop Date       Source

 

                Current some day smoker 2021 00:00:00                 [facility]







Medications







       Ordered Filled Start  Stop   Current Ordering Indication Dosage Frequency

 Signature

                    Comments            Components          Source



     Medication Medication Date Date Medication? Clinician                (SIG) 

          



     Name Name                                                   

 

     OMEPRAZOLE            Yes                      Take by           [facility]

ers



     ORAL      5-24                               mouth.           ity of



               07:19:                                              [address]

 

     OMEPRAZOLE            Yes                      Take by           [facility]

ers



     ORAL      5-24                               mouth.           ity of



               07:19:                                              [address]

 

     No known            No                                      Univers



     medications      5-24                                              ity of



               07:19:                                              [address]

 

     pantoprazol      2022- No             40mg      Take 40 mg          

 Univers



     e 40 mg EC      5-24                          by mouth           ity 

of



     tablet      07:19: 00:00                          daily.           [place]



               16   :00                                          [facility]

 

     pantoprazol      2022- No             40mg      Take 40 mg          

 Univers



     e 40 mg EC      5-24 05-24                          by mouth           ity 

of



     tablet      07:19: 00:00                          daily.           [place]



               16   :00                                          Medical



                                                                 Branch

 

     SERTraline            Yes       [id number] 100mg      Take 1           

Univers



     100 mg      5-24                               tablet by           ity of



     tablet      00:00:                               [facility]



               00                                 every           Medical



                                                  morning.           Branch

 

     SERTraline            Yes       [id number] 100mg      Take 1           

Univers



     100 mg      5-24                               tablet by           ity of



     tablet      00:00:                               [facility]



               00                                 every           Medical



                                                  morning.           Branch

 

     SERTRALINE      2022- No        [id number] 100mg      TAKE 1          

 Univers



     100 mg      3-23 05-24                          TABLET BY           ity of



     tablet      00:00: 00:00                          [facility]



               00   :00                           EVERY           Medical



                                                  MORNING           Branch

 

     SERTRALINE      2022- No        [id number] 100mg      TAKE 1          

 Univers



     100 mg      3-23 05-24                          TABLET BY           ity of



     tablet      00:00: 00:00                          [facility]



               00   :00                           EVERY           Medical



                                                  MORNING           Branch

 

     albuterol      2022- No        [id number] 2{puff}      Inhale 2       

    Univers



     90        9-13 05-24                          Puffs           ity of



     mcg/actuati      00:00: 00:00                          every 6           [place]

[place]



     on inhaler      00   :00                           (six)           Medical



                                                  hours as           Branch



                                                  needed for           



                                                  Wheezing           



                                                  or             



                                                  Shortness           



                                                  of Breath.           

 

     albuterol      2022- No        [id number] 2{puff}      Inhale 2       

    Univers



     90        9-13 05-24                          Puffs           ity of



     mcg/actuati      00:00: 00:00                          every 6           Te

[place]



     on inhaler      00   :00                           (six)           Medical



                                                  hours as           Branch



                                                  needed for           



                                                  Wheezing           



                                                  or             



                                                  Shortness           



                                                  of Breath.           

 

     benzonatate      2022- No        [id number] 100mg      Take 1         

  Univers



     (TESSALON      5-20 05-24                          capsule by           ity

 of



     PERLES) 100      00:00: 00:00                          mouth 3           Te

[place]



     mg capsule      00   :00                           (three)           Medica

l



                                                  times           Branch



                                                  daily.           

 

     benzonatate      2022- No        [id number] 100mg      Take 1         

  Univers



     (TESSALON      5-20 05-24                          capsule by           ity

 of



     PERLES) 100      00:00: 00:00                          mouth 3           Te

[place]



     mg capsule      00   :00                           (three)           Medica

l



                                                  times           Branch



                                                  daily.           







Immunizations







           Ordered    Filled Immunization Date       Status     Comments   [facility]

e



           Immunization Name Name                                        

 

           SARS-COV-2 COVID-19            2021 Completed             Unive

rsity of



           MODERNA VACCINE            00:00:00                         [place] [facility]

ical



                                                                  Branch

 

           SARS-COV-2 COVID-19            2021 Completed             Unive

rsity of



           MODERNA VACCINE            00:00:00                         [place] [facility]

ical



                                                                  Branch

 

           SARS-COV-2 COVID-19            2021 Completed             Unive

rsity of



           MODERNA VACCINE            00:00:00                         [place] [facility]

ical



                                                                  Branch

 

           SARS-COV-2 COVID-19            2021 Completed             Unive

rsity of



           MODERNA VACCINE            00:00:00                         [place] [facility]

ical



                                                                  Branch

 

           SARS-COV-2 COVID-19            2021 Completed             Unive

rsity of



           MODERNA VACCINE            00:00:00                         [place] [facility]

ical



                                                                  Branch

 

           SARS-COV-2 COVID-19            2021 Completed             Unive

rsity of



           MODERNA VACCINE            00:00:00                         [place] [facility]

ical



                                                                  Branch

 

           Pneumococcal            2020-10-08 Completed             University o

f



           Polysaccharide,            00:00:00                         [place] [facility]

ical



           PPSV23 (PNEUMOVAX)                                             Branch

 

           Influenza High Dose            2020-10-08 Completed             Unive

rsity of



           Quad                  00:00:00                         [facility]

 

           Pneumococcal            2020-10-08 Completed             University o

f



           Polysaccharide,            00:00:00                         [place] [facility]

ical



           PPSV23 (PNEUMOVAX)                                             Branch

 

           Influenza High Dose            2020-10-08 Completed             Unive

rsity of



           Quad                  00:00:00                         [facility]

 

           Pneumococcal            2020-10-08 Completed             University o

f



           Polysaccharide,            00:00:00                         [place] [facility]

ical



           PPSV23 (PNEUMOVAX)                                             Branch

 

           Influenza High Dose            2020-10-08 Completed             Unive

rsity of



           Quad                  00:00:00                         [facility]

 

           Influenza High Dose            2018 Completed             Unive

rsity of



                                 00:00:00                         [facility]

 

           Influenza High Dose            2018 Completed             Unive

rsity of



                                 00:00:00                         [facility]

 

           Influenza High Dose            2018 Completed             Unive

rsity of



                                 00:00:00                         [facility]







Vital Signs







             Vital Name   Observation Time Observation Value Comments     Source

 

             Systolic blood 2022 12:13:00 117 mm[Hg]                Univer

sity of [facility]

 

             Diastolic blood 2022 12:13:00 68 mm[Hg]                 Unive

rsity of [facility]

 

             Heart rate   2022 12:13:00 66 /min                   [facility]

 

             Body height  2022 12:13:00 162.6 cm                  [facility]

 

             Body weight  2022 12:13:00 69.854 kg                 [facility]

 

             BMI          2022 12:13:00 26.43 kg/m2               [facility]







Procedures

This patient has no known procedures.



Encounters







        Start   End     Encounter Admission Attending Care    Care    Encounter 

Source



        Date/Time Date/Time Type    Type    Clinicians Facility Department ID   

   

 

        2022 Office          [facility]    [id number]

[id number] [facility]



        07:00:00 07:15:00 Visit           [facility]  [id number]         it

y of



                                                [place] [id number]         James

as



                                                JULIA?BLEA [id number]         [address]                 



                                                OFFICE                  



                                                BUILDING                 

 

        2022 Telephone         Valeriano [facility]    [id number]

[id number] [facility]



        00:00:00 00:00:00                 [facility]  [id number]         it

y of



                                                [place] [id number]         James

as



                                                JULIA?BLEA [phone]         [address]                 



                                                OFFICE                  



                                                BUILDING                 







Results

This patient has no known results.

## 2022-09-28 NOTE — EDPHYS
Physician Documentation                                                                           

 [facility]                                                                 

Name: Dora Salazar                                                                              

Age: 72 yrs                                                                                       

Sex: Female                                                                                       

: 1950                                                                                   

MRN: [id number]                                                                                   

Arrival Date: 2022                                                                          

Time: 12:07                                                                                       

Account#: [id number]                                                                            

Bed 17                                                                                            

Private MD:                                                                                       

ED Physician Wilian Carrasco                                                                         

HPI:                                                                                              

                                                                                             

12:32 This 72 yrs old  Female presents to ER via Ambulatory with complaints of mild  jl9 

      Breathing Difficulty x2 days. Patient reports that she feels that she is developing         

      pneumonia. Prior smoker and history of COPD. Miguel reports feeling better after having     

      some of her daughter's albuterol. .                                                         

12:32 The patient has shortness of breath during heavy activity. Onset: The symptoms/episode  jl9 

      began/occurred 2 day(s) ago. The patient's shortness of breath has no apparent              

      modifying factors. Associated signs and symptoms: Pertinent positives: productive           

      cough. Severity of symptoms: Pain is currently a 0 / 10. The patient has experienced        

      similar episodes in the past.                                                               

                                                                                                  

Historical:                                                                                       

- Allergies:                                                                                      

12:11 Codeine;                                                                                iw  

- Home Meds:                                                                                      

12:11 Zoloft 100 mg Oral tab 1 tab once daily [Active];                                       iw  

- PMHx:                                                                                           

12:11 Depressive disorder;                                                                    iw  

- PSHx:                                                                                           

12:11 Total abdominal hysterectomy;                                                           iw  

                                                                                                  

- Immunization history:: Adult Immunizations up to date, Client reports receiving the             

  2nd dose of the Covid vaccine, Client reports receiving the 1st dose of the Covid               

  vaccine.                                                                                        

- Social history:: Smoking status: Patient reports the use of cigarette tobacco                   

  products, unknown amount.                                                                       

                                                                                                  

                                                                                                  

ROS:                                                                                              

12:33 Constitutional: Negative for fever, chills, and weight loss, Eyes: Negative for injury, jl9 

      pain, redness, and discharge, ENT: Negative for injury, pain, and discharge, Neck:          

      Negative for injury, pain, and swelling, Cardiovascular: Negative for chest pain,           

      palpitations, and edema.                                                                    

12:33 Abdomen/GI: Negative for abdominal pain, nausea, vomiting, diarrhea, and constipation,      

      Back: Negative for injury and pain, : Negative for injury, bleeding, discharge, and       

      swelling, MS/Extremity: Negative for injury and deformity, Skin: Negative for injury,       

      rash, and discoloration, Neuro: Negative for headache, weakness, numbness, tingling,        

      and seizure, Psych: Negative for depression, anxiety, suicide ideation, homicidal           

      ideation, and hallucinations, Allergy/Immunology: Negative for hives, rash, and             

      allergies, Endocrine: Negative for neck swelling, polydipsia, polyuria, polyphagia, and     

      marked weight changes, Hematologic/Lymphatic: Negative for swollen nodes, abnormal          

      bleeding, and unusual bruising.                                                             

12:33 Respiratory: Positive for cough, shortness of breath.                                       

                                                                                                  

Exam:                                                                                             

12:34 Abdomen/GI:  Soft, non-tender, with normal bowel sounds.  No distension or tympany.  No jl9 

      guarding or rebound.  No evidence of tenderness throughout. Back:  No spinal                

      tenderness.  No costovertebral tenderness.  Full range of motion. Skin:  Warm, dry with     

      normal turgor.  Normal color with no rashes, no lesions, and no evidence of cellulitis.     

      MS/ Extremity:  Pulses equal, no cyanosis.  Neurovascular intact.  Full, normal range       

      of motion. Neuro:  Awake and alert, GCS 15, oriented to person, place, time, and            

      situation.  Cranial nerves II-XII grossly intact.  Motor strength 5/5 in all                

      extremities.  Sensory grossly intact.  Cerebellar exam normal.  Normal gait. Psych:         

      Awake, alert, with orientation to person, place and time.  Behavior, mood, and affect       

      are within normal limits.                                                                   

12:34 Constitutional:  This is a well developed, well nourished patient who is awake, alert,      

      and in no acute distress. Head/Face:  Normocephalic, atraumatic. Eyes:  Pupils equal        

      round and reactive to light, extra-ocular motions intact.  Lids and lashes normal.          

      Conjunctiva and sclera are non-icteric and not injected.  Cornea within normal limits.      

      Periorbital areas with no swelling, redness, or edema. ENT:   Mucous membranes moist.       

      Neck:  Trachea midline, no thyromegaly or masses palpated, and no cervical                  

      lymphadenopathy.  Supple, full range of motion without nuchal rigidity, or vertebral        

      point tenderness.  No Meningismus. Chest/axilla:  Normal chest wall appearance and          

      motion.  Nontender with no deformity.  No lesions are appreciated. Cardiovascular:          

      Regular rate and rhythm with a normal S1 and S2.  No gallops, murmurs, or rubs.  Normal     

      PMI, no JVD.  No pulse deficits.                                                            

12:34 Respiratory: the patient does not display signs of respiratory distress,  Respirations:     

      normal, Breath sounds: + upper airway congestion.                                           

12:34 Respiratory: Breath sounds: are clear throughout.                                       [facility] 

                                                                                                  

Vital Signs:                                                                                      

12:10  / 77; Pulse 72; Resp 20; Temp 98.1(O); Pulse Ox 95% on R/A; Weight 72.57 kg (R); iw  

      Height 5 ft. 4 in. (162.56 cm); Pain 0/10;                                                  

14:09  / 99; Pulse 71; Resp 16; Pulse Ox 99% ;                                          bp  

12:10 Body Mass Index 27.46 (72.57 kg, 162.56 cm)                                             iw  

                                                                                                  

MDM:                                                                                              

12:15 Patient medically screened.                                                             jl9 

12:34 Data reviewed: vital signs, nurses notes. Test interpretation: by ED physician or       [facility] 

      midlevel provider: ECG.                                                                     

13:44 Counseling: I had a detailed discussion with the patient and/or guardian regarding: the [facility] 

      historical points, exam findings, and any diagnostic results supporting the                 

      discharge/admit diagnosis, lab results, radiology results, the need for outpatient          

      follow up, to return to the emergency department if symptoms worsen or persist or if        

      there are any questions or concerns that arise at home.                                     

14:39 ED course: Patient currently asymptomatic and wishes to be discharged. Agrees to follow [facility] 

      up with PCP in 1-2 days. .                                                                  

                                                                                                  

                                                                                             

12:17 Order name: Basic Metabolic Panel; Complete Time: 13:24                                                                                                                              

12:17 Order name: CBC with Diff; Complete Time: 13:24                                                                                                                                      

12:17 Order name: Troponin HS; Complete Time: 13:24                                                                                                                                        

12:17 Order name: D-Dimer; Complete Time: 13:40                                                                                                                                            

12:17 Order name: BNP; Complete Time: 13:24                                                                                                                                                

12:17 Order name: XRAY Chest (1 view); Complete Time: 13:24                                                                                                                                

12:17 Order name: EKG; Complete Time: 12:18                                                                                                                                                

12:17 Order name: Cardiac monitoring; Complete Time: 12:58                                                                                                                                 

12:17 Order name: EKG - Nurse/Tech; Complete Time: 12:37                                                                                                                                   

12:17 Order name: IV Saline Lock; Complete Time: 12:44                                                                                                                                     

12:17 Order name: Labs collected and sent; Complete Time: 12:44                                                                                                                            

12:19 Order name: D-Dimer; Complete Time: 13:24                                               EDMS

                                                                                             

12:26 Order name: SARS-COV-2 RT PCR (Document "Date of Onset" if Symptomatic)                 jl9 

                                                                                             

12:17 Order name: O2 Per Protocol; Complete Time: 12:58                                       jl9 

                                                                                             

12:17 Order name: O2 Sat Monitoring; Complete Time: 12:58                                     jl9 

                                                                                                  

Administered Medications:                                                                         

No medications were administered                                                                  

                                                                                                  

                                                                                                  

Disposition:                                                                                      

17:31 Co-signature as Attending Physician, Wilian Carrasco MD.                                    rn  

                                                                                                  

Disposition Summary:                                                                              

22 14:40                                                                                    

Discharge Ordered                                                                                 

      Location: Home                                                                          jl9 

      Condition: Stable                                                                       jl9 

      Diagnosis                                                                                   

        - Dyspnea, unspecified                                                                jl9 

      Followup:                                                                               jl9 

        - With: Private Physician                                                                  

        - When: 1 - 2 days                                                                         

        - Reason: Recheck today's complaints, Continuance of care, Re-evaluation by your           

      physician                                                                                   

      Discharge Instructions:                                                                     

        - Discharge Summary Sheet                                                             jl9 

        - Shortness of Breath, Adult, Easy-to-Read                                            jl9 

      Forms:                                                                                      

        - Medication Reconciliation Form                                                      jl9 

        - Thank You Letter                                                                    jl9 

        - Antibiotic Education                                                                jl9 

        - Prescription Opioid Use                                                             jl9 

      Prescriptions:                                                                              

        - albuterol sulfate 90 mcg/actuation Inhalation HFA aerosol inhaler                        

            - inhale 2 puff by INHALATION route every 4-6 hours As needed; 18 gram; Refills:  jl9 

      0, Product Selection Permitted                                                              

        - Prednisone 20 mg Oral Tablet                                                             

            - take 2 tablets by ORAL route once daily for 5 days; 10 tablet; Refills: 0,      jl9 

      Product Selection Permitted                                                                 

Signatures:                                                                                       

Dispatcher [employer]                           EDMS                                                 

Irma Pond RN RN iw Nieto, Roman, MD MD rn Linares, John                                jl9                                                  

                                                                                                  

Corrections: (The following items were deleted from the chart)                                    

12:13 12:11 PSHx: hyst; iw                                                                    iw  

13:41 12:32 This 72 yrs old  Female presents to ER via Ambulatory with complaints of jl9 

      Breathing Difficulty x2 days. Patient reports that she feels that she is developing         

      pneumonia. . jl9                                                                            

13:43 12:32 This 72 yrs old  Female presents to ER via Ambulatory with complaints of jl9 

      Breathing Difficulty x2 days. Patient reports that she feels that she is developing         

      pneumonia. Prior smoker and history of COPD.. jl9                                           

13:44 12:34 Constitutional: This is a well developed, well nourished patient who is awake,    jl9 

      alert, and in no acute distress. Head/Face: Normocephalic, atraumatic. Eyes: Pupils         

      equal round and reactive to light, extra-ocular motions intact. Lids and lashes normal.     

      Conjunctiva and sclera are non-icteric and not injected. Cornea within normal limits.       

      Periorbital areas with no swelling, redness, or edema. ENT: Mucous membranes moist.         

      Neck: Trachea midline, no thyromegaly or masses palpated, and no cervical                   

      lymphadenopathy. Supple, full range of motion without nuchal rigidity, or vertebral         

      point tenderness. No Meningismus. Chest/axilla: Normal chest wall appearance and            

      motion. Nontender with no deformity. No lesions are appreciated. Cardiovascular:            

      Regular rate and rhythm with a normal S1 and S2. No gallops, murmurs, or rubs. Normal       

      PMI, no JVD. No pulse deficits. jl9                                                         

14:39 12:32 This 72 yrs old  Female presents to ER via Ambulatory with complaints of jl9 

      Breathing Difficulty x2 days. Patient reports that she feels that she is developing         

      pneumonia. Prior smoker and history of COPD. Patien reports feeling better after having     

      some of her daughter's albuterol. . jl9                                                     

                                                                                                  

**************************************************************************************************

## 2022-09-29 NOTE — EKG
Test Date:    2022-09-28               Test Time:    12:27:52

Technician:   AMANDA                                     

                                                     

MEASUREMENT RESULTS:                                       

Intervals:                                           

Rate:         70                                     

ID:           [id number]                                    

QRSD:         72                                     

QT:           402                                    

QTc:          434                                    

Axis:                                                

P:            65                                     

ID:           [id number]                                    

QRS:          76                                     

T:            74                                     

                                                     

INTERPRETIVE STATEMENTS:                                       

                                                     

Normal sinus rhythm

Normal ECG

Compared to ECG 09/30/2019 12:40:08

Myocardial infarct finding no longer present



Electronically Signed On 09-29-22 08:26:48 CDT by Cesar Salas

## 2022-09-30 VITALS — SYSTOLIC BLOOD PRESSURE: 139 MMHG | OXYGEN SATURATION: 99 % | DIASTOLIC BLOOD PRESSURE: 99 MMHG

## 2022-09-30 VITALS — TEMPERATURE: 98.1 F
